# Patient Record
Sex: FEMALE | Race: BLACK OR AFRICAN AMERICAN
[De-identification: names, ages, dates, MRNs, and addresses within clinical notes are randomized per-mention and may not be internally consistent; named-entity substitution may affect disease eponyms.]

---

## 2019-03-13 LAB
ANION GAP SERPL CALC-SCNC: 9 MMOL/L (ref 5–19)
BUN SERPL-MCNC: 8 MG/DL (ref 7–20)
CALCIUM: 10.3 MG/DL (ref 8.4–10.2)
CHLORIDE SERPL-SCNC: 101 MMOL/L (ref 98–107)
CO2 SERPL-SCNC: 29 MMOL/L (ref 22–30)
ERYTHROCYTE [DISTWIDTH] IN BLOOD BY AUTOMATED COUNT: 14.8 % (ref 11.5–14)
GLUCOSE SERPL-MCNC: 100 MG/DL (ref 75–110)
HCT VFR BLD CALC: 40.7 % (ref 36–47)
HGB BLD-MCNC: 13.8 G/DL (ref 12–15.5)
MCH RBC QN AUTO: 29.7 PG (ref 27–33.4)
MCHC RBC AUTO-ENTMCNC: 33.9 G/DL (ref 32–36)
MCV RBC AUTO: 88 FL (ref 80–97)
PLATELET # BLD: 309 10^3/UL (ref 150–450)
POTASSIUM SERPL-SCNC: 4 MMOL/L (ref 3.6–5)
RBC # BLD AUTO: 4.65 10^6/UL (ref 3.72–5.28)
SODIUM SERPL-SCNC: 139.3 MMOL/L (ref 137–145)
WBC # BLD AUTO: 5.6 10^3/UL (ref 4–10.5)

## 2019-03-20 ENCOUNTER — HOSPITAL ENCOUNTER (OUTPATIENT)
Dept: HOSPITAL 62 - OROUT | Age: 64
Discharge: HOME | End: 2019-03-20
Attending: SURGERY
Payer: COMMERCIAL

## 2019-03-20 VITALS — SYSTOLIC BLOOD PRESSURE: 148 MMHG | DIASTOLIC BLOOD PRESSURE: 72 MMHG

## 2019-03-20 DIAGNOSIS — F17.210: ICD-10-CM

## 2019-03-20 DIAGNOSIS — Z88.5: ICD-10-CM

## 2019-03-20 DIAGNOSIS — C77.3: ICD-10-CM

## 2019-03-20 DIAGNOSIS — M06.9: ICD-10-CM

## 2019-03-20 DIAGNOSIS — Z79.84: ICD-10-CM

## 2019-03-20 DIAGNOSIS — Z80.3: ICD-10-CM

## 2019-03-20 DIAGNOSIS — D05.11: Primary | ICD-10-CM

## 2019-03-20 DIAGNOSIS — E11.9: ICD-10-CM

## 2019-03-20 DIAGNOSIS — Z79.899: ICD-10-CM

## 2019-03-20 DIAGNOSIS — I10: ICD-10-CM

## 2019-03-20 PROCEDURE — 36415 COLL VENOUS BLD VENIPUNCTURE: CPT

## 2019-03-20 PROCEDURE — 76098 X-RAY EXAM SURGICAL SPECIMEN: CPT

## 2019-03-20 PROCEDURE — 78195 LYMPH SYSTEM IMAGING: CPT

## 2019-03-20 PROCEDURE — 19301 PARTIAL MASTECTOMY: CPT

## 2019-03-20 PROCEDURE — A9520 TC99 TILMANOCEPT DIAG 0.5MCI: HCPCS

## 2019-03-20 PROCEDURE — 82962 GLUCOSE BLOOD TEST: CPT

## 2019-03-20 PROCEDURE — 80048 BASIC METABOLIC PNL TOTAL CA: CPT

## 2019-03-20 PROCEDURE — 88307 TISSUE EXAM BY PATHOLOGIST: CPT

## 2019-03-20 PROCEDURE — 88342 IMHCHEM/IMCYTCHM 1ST ANTB: CPT

## 2019-03-20 PROCEDURE — 38500 BIOPSY/REMOVAL LYMPH NODES: CPT

## 2019-03-20 PROCEDURE — 85027 COMPLETE CBC AUTOMATED: CPT

## 2019-03-20 PROCEDURE — 84132 ASSAY OF SERUM POTASSIUM: CPT

## 2019-03-20 RX ADMIN — FENTANYL CITRATE ONE MCG: 50 INJECTION INTRAMUSCULAR; INTRAVENOUS at 15:32

## 2019-03-20 RX ADMIN — FENTANYL CITRATE ONE MCG: 50 INJECTION INTRAMUSCULAR; INTRAVENOUS at 15:26

## 2019-03-20 NOTE — DISCHARGE SUMMARY
Discharge Summary (SDC)





- Discharge


Final Diagnosis: 





Right breast cancer


Date of Surgery: 03/20/19


Discharge Date: 03/20/19


Condition: Good


Treatment or Instructions: 








                             [facility]


                               [address], [place] [address]


                  Phone: [phone]    Fax:  [phone]


                                        





   Care Instructions Following Your Lumpectomy with Axillary Lymph Node Dissection


Activities:


Resume your normal activities when you feel comfortable.  It is best to remain 

as active as possible to speed your recovery. It is common to experience some 

fatigue after surgery.  Avoid strenuous activity such as weight lifting, tennis,

etc at your surgical site for one week. Perform gentle arm exercises daily and 

do not favor your operative arm to due increased risk of mobility issues 

postoperatively.  No driving for 7 days after surgery.  Do not drive if you are 

taking pain medication other than Tylenol or Ibuprofen.  No swimming, tub baths 

or soaking in a hot tub for 4 weeks. There are no dietary restrictions.  Do not 

smoke as this impairs wound healing.





Surgical Site care:


Leave the white paper tapes (steri strips) in place until follow up.  You may 

shower after 48 hours to include washing the wound with soap and water using 

your hands.   Do not scrub the incision. Pat the area dry with a towel. You do 

not need to recover the wound although some patients find that they feel more 

comfortable using a light dressing for a few days to absorb any minimal drainage

which may occur.  Many patients also find that keeping a dressing around the 

drain exit site is helpful to absorb any drainage which may leak around the 

tubing.  If you use a dressing in this manner change it at least every day.  Do 

not use heating pad or apply an ice pack to the operative site.  You may apply 

deodorant if you are careful to avoid getting it on the wound itself. 





Empty the bulbs attached to the drain every 12 hours and measure the fluid 

output separately from each drain.  Please also strip each drain each time you

empty it to prevent clogging.   Keep a record of the output and bring this r

ecord with you each time you come to the office for postoperative care. A drain 

is ready to be removed when its output is 30 mL per 24 hours per drain for 2 

consecutive days.  Please call the office to inform our staff that you need to 

come in for drain removal. 





Most patients are more comfortable if they wear a supportive, sports-type bra 

24/7 for the first few days after surgery. Ice packs may help relieve the 

discomfort and swelling as well in the first 24 hours. Do not place an ice pack 

directly on the skin. Do not use a heating pad on the operative sites. 





Medications:


Ultram one pill by mouth every six hours as needed for pain.. 





Resume all of your normal prescription medications after your surgery unless 

instructed otherwise. 





You may experience constipation after surgery while taking pain medications.  If

using a narcotic on a regular basis, take a stool softener such as Colace twice 

a day.  It is helpful to stay hydrated by drinking lots of fluids.  Walking is 

also helpful and is good exercise after surgery.  If you need extra help, use 

Milk of Magnesia according to the directions on the package. 





Follow-up:


Call our office at [phone] to make a follow-up appointment in 10-14 days.

 Your doctor will call to discuss the pathology report with you as soon as it is

available.   





Concerns: Some bruising will occur and will go away over time.  If you notice 

significant leakage around the drains, this is not normal. The drains may be 

clogged. Please call our office to come in immediately for the drains to be 

checked. If you have a fever of 101.5 or greater, chills, redness at the 

incision site, excessive drainage from your wound or severe pain not relieved by

pain medication, call your doctor.  A physician is available 24 hours a day 7 

days a week in addition to regular office hours.  If problems arise after normal

office hours please call the hospital at [phone].  Please call [phone] if you have any questions or concerns.  





Prescriptions: 


Tramadol HCl [Ultram 50 mg Tablet] 50 mg PO Q6HP PRN #20 tab


 PRN Reason: 


Referrals: 


CHAITANYA FARIAS MD [Primary Care Provider] - 


Discharge Diet: As Tolerated


Discharge Activity: Balance Activity w/Rest, Walk Frequently


Report the Following to Your Physician Immediately: Increase in Pain, Fever over

101 Degrees, Unusual Bleeding, Redness, Swelling, Warmth, Increased Soreness, 

Drainage-Green, Drainage-Foul Smelling

## 2019-03-20 NOTE — RADIOLOGY REPORT (SQ)
EXAM DESCRIPTION:  NM LYMPHATICS/LYMPH GLANDS



COMPLETED DATE/TIME:  3/20/2019 10:46 am



REASON FOR STUDY:  MALIGNANT NEOPLASM OF BREAST C50.919  MALIGNANT NEOPLASM OF UNSP SITE OF UNSPECIFI
ED FEMAL



COMPARISON:  None.



RADIONUCLIDE AND DOSE:  629 microcuries TC-99m tilmanocept - [employer].

The route of agent administration:  Subcutaneous in the skin.



TECHNIQUE:  The skin of the right breast was prepped in sterile fashion.  The radiopharmaceutical was
 administered in equally divided doses in the periareolar breast.



LIMITATIONS:  None.



FINDINGS:  Images demonstrate activity at the injection site.



IMPRESSION:  ADMINISTRATION OF RADIOPHARMACEUTICAL FOR SENTINEL LYMPH NODE EVALUATION.



TECHNICAL DOCUMENTATION:  JOB ID:  [id number]

 2011 [employer]- All Rights Reserved



Reading location - IP/workstation name: [email or web address]

## 2019-03-20 NOTE — OPERATIVE REPORT
Operative Report


DATE OF SURGERY: 03/20/19


PREOPERATIVE DIAGNOSIS: Infiltrating ductal carcinoma, status post neoadjuvant 

antiestrogen therapy, upper inner quadrant right breast


POSTOPERATIVE DIAGNOSIS: Same


OPERATION: 1.  Focused ultrasound directed right breast lumpectomy with drain 

placement.  2.  [place] lymph node biopsy right axilla x1


SURGEON: KVNG ECHEVERRIA ASSISTANT: GIL ESTRELLA


ANESTHESIA: GA


TISSUE REMOVED OR ALTERED: Right breast lumpectomy specimen; right axillary 

lymph node specimen


COMPLICATIONS: 





None


ESTIMATED BLOOD LOSS: Scant


INTRAOPERATIVE FINDINGS: See below


PROCEDURE: 





Patient was seen in the preop holding area with the right breast was marked.  

Site neoprobe assessment demonstrated increased activity in the right axilla.





The patient was taken to the operating room where general anesthesia was 

induced.  The right arm was abducted, right breast and axilla exposed, right 

breast injected with approximately 2 and half cc of methylene blue, 10 o'clock 

position, right breast, intradermally, at the 10 o'clock position.  The right 

breast was massaged.  The right breast and axilla were then prepped and draped 

in sterile fashion.





Surgical plan and surgical timeout were conducted.





We proceeded with sentinel lymph node biopsy first.  An area of increased 

activity was identified in the right axilla.  The skin was anesthetized with 1% 

plain lidocaine.  Approximately 2 cm incision was made with a knife, and an 

underlying level 1 hot blue lymph node was identified.  The in vivo count was 

4400, and the ex vivo count was 4100.  The specimen was sent is level 1, blue 

and hot lymph node from the right axilla.





We returned to the axilla check for any residual counts and there was 

essentially none.  We concluded that the sentinel lymph node biopsy portion of 

the operation was complete.





We now level the patient out using ultrasound as a guide, localized the right 

breast tumor which was in the upper inner quadrant of the right breast, 

approximately 6 cm from the nipple, horizontally oriented as well as vertically 

directed.  We marked the skin for an elliptical incision overlying the point of 

maximum distortion of the breast tissue by ultrasonography.  The skin was 

anesthetized with 1% plain lidocaine.  An ellipse was made with a #10 blade, and

a generous lumpectomy specimen taken all the way down to the chest wall 

including the pectoralis muscle fascia was then performed.  The lumpectomy 

specimen was removed from the breast, labeled with a short suture at the 12 

o'clock position on long suture at the 9 o'clock position.  The specimen was 

imaged with the in-house, in room radiographic instrument and this demonstrated 

the clip in the middle of the tumor as well as the tumor in the middle of the 

lumpectomy  specimen.  We felt this portion of the operation was complete.











We checked both wound cavities and there was no evidence of mechanical bleeding.

 A large Manjit drain was placed into the recesses of the lumpectomy cavity, and 

brought out through the right axillary incision.  The drain was secured to the 

skin with 2-0 Prolene suture.  All wounds closed with 2-0 and 3-0 Vicryl, 

Dermabond glue.





Patient tolerated the procedure well, extubated, and taken to recovery in stable

condition.


The physician assistant, Ms. Cool, provided assistance during this case by: 

Assisting  with retracting tissue, instillation of local anesthesia and closure 

of skin incisions.

## 2019-03-21 NOTE — RADIOLOGY REPORT (SQ)
EXAM DESCRIPTION:  BREAST SPECIMEN



COMPLETED DATE/TIME:  3/20/2019 3:07 pm



REASON FOR STUDY:  RIGHT BREAST SPECIMEN IN OR C50.919  MALIGNANT NEOPLASM OF UNSP SITE OF UNSPECIFIE
D FEMAL



COMPARISON:  9/28/2018



TECHNIQUE:  Specimen radiograph from breast procedure performed in the operating room.



LIMITATIONS:  None.



FINDINGS:  Specimen radiograph from breast procedure performed in the operating room.

Please see procedure note for details and final pathology.



IMPRESSION:  Specimen radiograph.



TECHNICAL DOCUMENTATION:  JOB ID:  [id number]



Reading location - IP/workstation name: [email or web address]

## 2019-08-06 ENCOUNTER — HOSPITAL ENCOUNTER (OUTPATIENT)
Dept: HOSPITAL 62 - WI | Age: 64
End: 2019-08-06
Attending: PHYSICIAN ASSISTANT
Payer: COMMERCIAL

## 2019-08-06 DIAGNOSIS — Z79.811: ICD-10-CM

## 2019-08-06 DIAGNOSIS — M81.0: Primary | ICD-10-CM

## 2019-08-06 PROCEDURE — 77080 DXA BONE DENSITY AXIAL: CPT

## 2019-08-06 NOTE — WOMENS IMAGING REPORT
EXAM DESCRIPTION:  BONE DENSITY HIP/SPINE



COMPLETED DATE/TIME:  8/6/2019 9:01 am



REASON FOR STUDY:  M81.0 AGE-RELATED OSTEOPOROSIS WITHOUT CURRENT PATHOLOGICAL FRACTURE M81.0  AGE-RE
LATED OSTEOPOROSIS W/O CURRENT PATHOLOGICAL FRAC



COMPARISON:   None.



TECHNIQUE:  Dual-Energy X-ray Absorptiometry (DEXA) of the AP Spine and Hip.



LIMITATIONS:  None.



FINDINGS:  LUMBAR SPINE:

The bone mineral density (BMD) measured from L1-L4 in the AP projection correlates with a T-score of 
-1.4, which is osteopenic as defined by the World Health Organization.

BMD Change vs Baseline:  N/A

HIP:

The bone mineral density (BMD) measured in the left femoral neck at the hip correlates with a T-score
 of -1.2, which is osteopenic as defined by the World Health Organization.

BMD Change vs Baseline:  N/A

10 year Fracture Risk Assessment:

Major Osteoporotic Fracture:  4%

Hip Fracture:  0.2%



IMPRESSION:  1. LUMBAR SPINE WHO CLASSIFICATION: Osteopenic

2. HIP WHO CLASSIFICATION: Osteopenic

OVERALL ASSESSMENT:

WHO CLASSIFICATION:  Osteopenic



COMMENT:  The World Health Organization defines low BMD as follows:

T-score:

Normal:  Greater than -1.0

Osteopenia: Between -1.0 and -2.5

Osteoporosis:  Less than -2.5 without fractures

Established osteoporosis:  Less than -2.5 with fractures

In general, you may wish to consider:

Diagnosis          Treatment                     Follow-up DEXA

Normal BMD      Prevention                    2-3 years

Osteopenia       Prevention/Therapy        1-2 years

Osteoporosis     Therapy                        Yearly



TECHNICAL DOCUMENTATION:  JOB ID:  [id number]

 2011 [employer]- All Rights Reserved



Reading location - IP/workstation name: [email or web address]

## 2020-01-06 ENCOUNTER — HOSPITAL ENCOUNTER (OUTPATIENT)
Dept: HOSPITAL 62 - WI | Age: 65
End: 2020-01-06
Attending: INTERNAL MEDICINE
Payer: COMMERCIAL

## 2020-01-06 DIAGNOSIS — C50.211: Primary | ICD-10-CM

## 2020-01-06 PROCEDURE — 77066 DX MAMMO INCL CAD BI: CPT

## 2020-07-07 ENCOUNTER — HOSPITAL ENCOUNTER (OUTPATIENT)
Dept: HOSPITAL 62 - WI | Age: 65
End: 2020-07-07
Attending: INTERNAL MEDICINE
Payer: COMMERCIAL

## 2020-07-07 DIAGNOSIS — C50.211: Primary | ICD-10-CM

## 2020-07-07 PROCEDURE — 77065 DX MAMMO INCL CAD UNI: CPT

## 2020-07-07 NOTE — WOMENS IMAGING REPORT
EXAM DESCRIPTION:  RIGHT DIAGNOSTIC MAMMO W/CAD



IMAGES COMPLETED DATE/TIME:  7/7/2020 9:48 am



REASON FOR STUDY:  C50.211 MALIGNANT NEOPLASM OF UPPER-INNER QUADRANT OF RIGHT FEMALE BREAST C50.211 
 MALIG NEOPLM OF UPPER-INNER QUADRANT OF RIGHT FEMALE



COMPARISON:  Mammograms 1/6/2020, 9/28/2018, 9/4/2018



EXAM PARAMETERS:  Standard craniocaudal, 90 mediolateral and mediolateral oblique images of the righ
t breast recorded with digital acquisition.

Read with the assistance of CAD.

.[facility] - R2  Version 9.2



LIMITATIONS:  None.



FINDINGS:  BREAST LATERALITY: Right

MASSES: No suspicious masses.

CALCIFICATIONS: No new or suspicious calcifications.

ARCHITECTURAL DISTORTION: Postoperative architectural distortion is present in the upper inner quadra
nt right breast post lumpectomy.  This is similar compared to 1/6/2020

ASYMMETRY: None noted.

OTHER: No other significant findings.



IMPRESSION:  Post therapeutic changes upper inner quadrant right breast.  No mammographic evidence fo
r malignancy.



BREAST DENSITY:  b. There are scattered areas of fibroglandular density.



BIRAD:  ASSESSMENT:  2 Benign findings.



RECOMMENDATION:  RECOMMENDED FOLLOW UP: Continue left breast screening mammography, right breast diag
nostic mammography in January 2021

SPECIFIC INTERVENTION/IMAGING/CONSULTATION RECOMMENDED:No additional intervention/ imaging/consultati
on needed at this time.

COMMUNICATION:Patient notified by letter



COMMENT:  The patient has been notified of the results by letter per MQSA requirements. Additional no
tification policies are in place for contacting patient with suspicious or incomplete findings.

Quality ID #225: The American College of Radiology recommends an annual screening mammogram for women
 aged 40 years or over. This facility utilizes a reminder system to ensure that all patients receive 
reminder letters, and/or direct phone calls for appointments. This includes reminders for routine scr
eening mammograms, diagnostic mammograms, or other Breast Imaging Interventions when appropriate.  Th
is patient will be placed in the appropriate reminder system.



TECHNICAL DOCUMENTATION:  FINDING NUMBER: (1)

ASSESSMENT: (1)

JOB ID:  [id number]

 2011 [employer]- All Rights Reserved



Reading location - IP/workstation name: [email or web address]

## 2020-08-11 ENCOUNTER — HOSPITAL ENCOUNTER (EMERGENCY)
Dept: HOSPITAL 62 - ER | Age: 65
Discharge: HOME | End: 2020-08-11
Payer: COMMERCIAL

## 2020-08-11 VITALS — DIASTOLIC BLOOD PRESSURE: 77 MMHG | SYSTOLIC BLOOD PRESSURE: 120 MMHG

## 2020-08-11 DIAGNOSIS — R07.89: ICD-10-CM
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
## 2020-08-11 DIAGNOSIS — Z88.6: ICD-10-CM

## 2020-08-11 DIAGNOSIS — I10: ICD-10-CM

## 2020-08-11 DIAGNOSIS — M79.18: Primary | ICD-10-CM

## 2020-08-11 DIAGNOSIS — Z88.5: ICD-10-CM

## 2020-08-11 DIAGNOSIS — M06.9: ICD-10-CM

## 2020-08-11 DIAGNOSIS — Z79.899: ICD-10-CM

## 2020-08-11 PROCEDURE — 99283 EMERGENCY DEPT VISIT LOW MDM: CPT
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
## 2021-01-07 ENCOUNTER — HOSPITAL ENCOUNTER (OUTPATIENT)
Dept: HOSPITAL 62 - WI | Age: 66
End: 2021-01-07
Attending: SURGERY
Payer: COMMERCIAL

## 2021-01-07 DIAGNOSIS — Z08: ICD-10-CM

## 2021-01-07 DIAGNOSIS — N63.21: Primary | ICD-10-CM

## 2021-01-07 DIAGNOSIS — Z85.3: ICD-10-CM

## 2021-01-07 PROCEDURE — 77066 DX MAMMO INCL CAD BI: CPT

## 2021-01-07 NOTE — WOMENS IMAGING REPORT
EXAM DESCRIPTION:  BILAT DIAGNOSTIC MAMMO W/CAD



IMAGES COMPLETED DATE/TIME:  1/7/2021 10:15 am



REASON FOR STUDY:  Z85.3 PERSONAL HISTORY OF MALIGNANT NEOPLASM OF BREAST Z85.3  PERSONAL HISTORY OF 
MALIGNANT NEOPLASM OF BREAST



COMPARISON:  2018, 2020



EXAM PARAMETERS:  Standard craniocaudal and mediolateral oblique views of each breast recorded using 
digital acquisition.

TRUE LATERAL, EXAGGERATED CC AND CONE COMPRESSION VIEWS RIGHT BREAST.

Read with the assistance of CAD:

.[facility] - R2  Version 9.2



LIMITATIONS:  None.



FINDINGS:  RIGHT BREAST

MASSES: No suspicious masses.

CALCIFICATIONS: No new or suspicious calcifications.

ARCHITECTURAL DISTORTION: Postlumpectomy, stable.

ASYMMETRY: None noted.

OTHER: No other significant findings.

LEFT BREAST

MASSES: Smooth mass upper outer quadrant about 8 cm deep.

CALCIFICATIONS: No new or suspicious calcifications.

ARCHITECTURAL DISTORTION: None.

ASYMMETRY: None noted.

OTHER: No other significant finding.



IMPRESSION:  Small mass left breast.



BREAST DENSITY:  b. There are scattered areas of fibroglandular density.



BIRAD:  ASSESSMENT:  0 Incomplete: Needs additional imaging evaluation and/or prior mammograms for co
mparison.



RECOMMENDATION:  RECOMMENDED FOLLOW UP: [facility] compression views and potential ultrasound left breast.

SPECIFIC INTERVENTION/IMAGING/CONSULTATION RECOMMENDED:No additional intervention/ imaging/consultati
on needed at this time.

COMMUNICATION:The negative/benign results were communicated to the patient.



COMMENT:  The patient has been notified of the results by letter per MQSA requirements. Additional no
tification policies are in place for contacting patient with suspicious or incomplete findings.

Quality ID #[id number]: The American College of [employer]iology recommends an annual screening mammogram for women
 aged 40 years or over. This facility utilizes a reminder system to ensure that all patients receive 
reminder letters, and/or direct phone calls for appointments. This includes reminders for routine scr
eening mammograms, diagnostic mammograms, or other Breast Imaging Interventions when appropriate.  Th
is patient will be placed in the appropriate reminder system.



TECHNICAL DOCUMENTATION:  FINDING NUMBER: (1)

ASSESSMENT: (1)

JOB ID:  [id number]

 2011 [employer]- All Rights Reserved



Reading location - IP/workstation name: [email or web address]

## 2021-01-22 ENCOUNTER — HOSPITAL ENCOUNTER (OUTPATIENT)
Dept: HOSPITAL 62 - WI | Age: 66
End: 2021-01-22
Attending: SURGERY
Payer: COMMERCIAL

## 2021-01-22 DIAGNOSIS — N60.02: Primary | ICD-10-CM

## 2021-01-22 PROCEDURE — 76642 ULTRASOUND BREAST LIMITED: CPT
